# Patient Record
Sex: MALE | Race: BLACK OR AFRICAN AMERICAN | NOT HISPANIC OR LATINO | Employment: FULL TIME | URBAN - METROPOLITAN AREA
[De-identification: names, ages, dates, MRNs, and addresses within clinical notes are randomized per-mention and may not be internally consistent; named-entity substitution may affect disease eponyms.]

---

## 2022-09-30 ENCOUNTER — TELEPHONE (OUTPATIENT)
Dept: PSYCHIATRY | Facility: CLINIC | Age: 37
End: 2022-09-30

## 2022-09-30 NOTE — TELEPHONE ENCOUNTER
Abhishek Blood left a message on the medication refill line at [facility]  Writer returned patient's call  [place] just needs a refill on his 10 mg of Lexapro  He is requesting that it be sent to the [facility] in [place]  Patient only has two days worth of medication left

## 2022-10-03 DIAGNOSIS — F41.1 GENERALIZED ANXIETY DISORDER: Primary | ICD-10-CM

## 2022-10-03 RX ORDER — ESCITALOPRAM OXALATE 10 MG/1
TABLET ORAL
Qty: 30 TABLET | Refills: 1 | Status: SHIPPED | OUTPATIENT
Start: 2022-10-03

## 2022-10-03 RX ORDER — ESCITALOPRAM OXALATE 10 MG/1
TABLET ORAL
Qty: 14 TABLET | Refills: 1 | Status: SHIPPED | OUTPATIENT
Start: 2022-10-03 | End: 2022-10-03 | Stop reason: SDUPTHER

## 2022-10-03 NOTE — TELEPHONE ENCOUNTER
Thanks  Regarding Pt Nasra Carty  1985  His chart was reviewed  Was last seen 22   Lexapro was refill and sent to his pharmacy on file

## 2022-10-03 NOTE — PROGRESS NOTES
Triage   Regarding patient Elva Donovan- : 1985)  Chart reviewed on Lexapro 10mg   Sent refill to pharmacy on file

## 2022-11-01 ENCOUNTER — TELEPHONE (OUTPATIENT)
Dept: BEHAVIORAL/MENTAL HEALTH CLINIC | Facility: CLINIC | Age: 37
End: 2022-11-01

## 2022-11-08 ENCOUNTER — TELEPHONE (OUTPATIENT)
Dept: BEHAVIORAL/MENTAL HEALTH CLINIC | Facility: CLINIC | Age: 37
End: 2022-11-08

## 2022-12-22 ENCOUNTER — TELEMEDICINE (OUTPATIENT)
Dept: BEHAVIORAL/MENTAL HEALTH CLINIC | Facility: CLINIC | Age: 37
End: 2022-12-22

## 2022-12-22 DIAGNOSIS — F41.1 GENERALIZED ANXIETY DISORDER: Primary | ICD-10-CM

## 2022-12-22 NOTE — PSYCH
Virtual Regular Visit    Verification of patient location:    Patient is located in the following state in which I hold an active license [place]      Assessment/Plan:    Problem List Items Addressed This Visit        Other    Generalized anxiety disorder - Primary       Goals addressed in session: Goal 1          Reason for visit is No chief complaint on file  Encounter provider ABHISHEK EPSTEIN DAISY    Provider located at [address] [place] [address] [address]  [address] [place] [address]  [phone]      Recent Visits  No visits were found meeting these conditions  Showing recent visits within past 7 days and meeting all other requirements  Today's Visits  Date Type Provider Dept   12/22/22 Telemedicine [address] [facility]   Showing today's visits and meeting all other requirements  Future Appointments  No visits were found meeting these conditions  Showing future appointments within next 150 days and meeting all other requirements       The patient was identified by name and date of birth  Ann White was informed that this is a telemedicine visit and that the visit is being conducted throughthe [employer] platform  He agrees to proceed     My office door was closed  No one else was in the room  He acknowledged consent and understanding of privacy and security of the video platform  The patient has agreed to participate and understands they can discontinue the visit at any time  Patient is aware this is a billable service  Subjective  Ann White is a 30 y o  male      Sadiq Giang engaged with the counselor around his current stressors  He explained that his son who is 6 yrs old has had a recent change in behavior  He explained that his son usually does well in school and is generally a good kid however over the last grading period he did not do as well as he usually does   He also stated that his son has been cursing, Lying, and being lazy about doing his house chores  Lolita Vasquez explained that his son has also been struggling in the sports that he plays  He explained that during practice and at home he is all in and does well however when it is time for a game he plays poorly  He stated that he and his wife have been trying to figure out what is going on as the change was out of no where  He talked about the way that he and his wife parent their kids and feels like they may need to spend more one on one time with him individually to give him attention and talk with him to see why he I behaving in this way or if there is something that he is struggling with  Lolita Vasquez explained that he is aware that his son has anxiety and feels like this could be playing a roll however he is unsure as his son has not been able to explain his feelings  Lolita Vasquez talked about himself and explained that he has been doing well, he got a new job that he likes, and he and his wife have been doing well in regards to their relationship  Lolita Vasquez was oriented 3x, he was neatly dressed, his eye contact and speech were normal, he denied all safety concerns and substance use  Lolita Vasquez was in a friendly mood and engaged well in session  He denied any depression or high anxiety and was open to suggestions and feedback  Valeriy to spend more one on one time with his son as he feels like this will help him to get to know his son more and figure out what he is struggling with to cause his change in behavior  Valeriy to look into a Big brother program for his son to attend  Future sessions scheduled  HPI     No past medical history on file  No past surgical history on file  Current Outpatient Medications   Medication Sig Dispense Refill   • escitalopram (Lexapro) 10 mg tablet Escitalopram Oxalate 10m tablet by mouth per day 30 tablet 1     No current facility-administered medications for this visit          Not on File    Review of Systems    Video Exam    There were no vitals filed for this visit      Physical Exam     12/22/22  Start Time: 1200  Stop Time: 1544  Total Visit Time: 45 minutes

## 2023-01-05 DIAGNOSIS — F41.1 GENERALIZED ANXIETY DISORDER: ICD-10-CM

## 2023-01-05 RX ORDER — ESCITALOPRAM OXALATE 10 MG/1
TABLET ORAL
Qty: 14 TABLET | Refills: 1 | Status: SHIPPED | OUTPATIENT
Start: 2023-01-05

## 2023-01-05 NOTE — TELEPHONE ENCOUNTER
Medication Refill Request     Name of Medication Lexapro   Dose/Frequency 10mg//1 tablet per day by mouth  Quantity 30  Verified pharmacy   [x]  Verified ordering Provider   [x]  Does patient have enough for the next 3 days? Yes [] No [x]  Does patient have a follow-up appointment scheduled?  Yes [x] No []   If so when is appointment: 1/16/23

## 2023-01-05 NOTE — TELEPHONE ENCOUNTER
Pt Jose Yuen  1985 , chart was reviewed, h/o ARSH , he was last seen , prior to then had 1 other appointment at [facility]  Has appending appointment within 1 wk      Sent script for Lexapro 10mg to [facility]       This note was not shared with the patient due to reasonable likelihood of causing patient harm

## 2023-01-16 ENCOUNTER — OFFICE VISIT (OUTPATIENT)
Dept: PSYCHIATRY | Facility: CLINIC | Age: 38
End: 2023-01-16

## 2023-01-16 DIAGNOSIS — F41.1 GENERALIZED ANXIETY DISORDER: ICD-10-CM

## 2023-01-16 RX ORDER — ESCITALOPRAM OXALATE 10 MG/1
TABLET ORAL
Qty: 90 TABLET | Refills: 0 | Status: SHIPPED | OUTPATIENT
Start: 2023-01-16

## 2023-01-16 NOTE — PSYCH
[facility]Hospital: [address] [facility]   [facility]    Psychiatric Progress Note  MRN#: [id number]  Tarah Amaral 30 y o  male    This note was not shared with the patient due to reasonable likelihood of causing patient harm   This Patient was seen in the office today at [address]  Subjective:  Solo Chavira reported mood as stable   He's on Lexapro 10mg- less anxiety feeling/ anxious  Prior to Lexapro he could not sleep and was jumpy - now without symptoms  Less anxiety while driving   Theres some worries although - normal worries     ROS:  Si/HI- no  Sleep- 7-8 hrs  Medication compliance: He's compliant , does not want to increased the dose of Lexapro  Medication side effects:none       Social Hx  Reported he works - at [employer]- he likes his jobs           Mental Status Evaluation:  General Appearance:  Tarah Amaral is a 30 y o   male casually dressed, adequate hygiene and grooming, looks stated age   Behavior:  pleasant, cooperative, calm, adequate eye contact   Speech:  normal for rate, rhythm, volume, latency, amount   Mood:  normal   Affect:  normal   Thought Process:  normal and logical   Thought Content:  no overt delusions, normal   Perceptual Disturbances: Does not appear responding or preoccupied  Delusions  No   Risk Potential: Suicidal Ideations No  Homicidal Ideations No  Potential for Aggression No     Sensorium:  Oriented to person, place, time/date and situation   Memory:  recent and remote memory grossly intact   Consciousness:  alert and awake   Attention: attention span and concentration are age appropriate   Insight:  fair   Judgment: fair   Gait/Station: normal   Motor Activity: no abnormal movements     There were no vitals filed for this visit       Medications:   Current Outpatient Medications on File Prior to Visit   Medication Sig Dispense Refill   • escitalopram (Lexapro) 10 mg tablet Escitalopram Oxalate 10m tablet po daily 14 tablet 1     No current facility-administered medications on file prior to visit  Labs: I have personally reviewed all pertinent laboratory/tests results  Most Recent Labs: No results found for: WBC, RBC, HGB, HCT, PLT, RDW, NEUTROABS, SODIUM, K, CL, CO2, BUN, CREATININE, GLUC, GLUF, CALCIUM, AST, ALT, ALKPHOS, TP, ALB, TBILI, CHOLESTEROL, HDL, TRIG, LDLCALC, NONHDLC, VALPROICTOT, CARBAMAZEPIN, LITHIUM, AMMONIA, [id number], FREET4, T3FREE, PREGSERUM, HCG, HCGQUANT, RPR, HGBA1C, EAG        ________________________________________________________________________    A/P  Candy Olvera, is a 30 yr old male , h/o non-spontaneous anxiety somatic symptoms linked to generalized worries  Today with stable mood since Lexapro 10mg > 2 months   ARSH 7 = 13---> 2    DSM 5  Generalized Anxiety Disorder with panic attack  History Illicit Anxiety Disorder      PLANS:   Discussed diagnotic impression based on history, external records reviewed and clinical findings  Lexapro 10mg         Treatement: Risks, benefits, and possible side effects of medications explained to patient and patient verbalizes understanding  Next Appointment: 3 months      Today's Appointment@ [facility]  Face To Face:  11:58 AM -12:09 PM;Documentation Time:  2:35 PM- 2:40 PM    Encounter Duration: Time Spent 11 minutes with Patient  Greater than 50% of total time was spent with the patient     MDM  Number of Diagnoses or Management Options  Generalized anxiety disorder: established, improving     Amount and/or Complexity of Data Reviewed  Review and summarize past medical records: yes    Risk of Complications, Morbidity, and/or Mortality  Presenting problems: low  Management options: low

## 2023-01-16 NOTE — PATIENT INSTRUCTIONS
Talha Stewart [id number]   Thanks for presenting to today's Appointment at [facility]  Your medications were not changed

## 2023-02-09 ENCOUNTER — TELEPHONE (OUTPATIENT)
Dept: PSYCHIATRY | Facility: CLINIC | Age: 38
End: 2023-02-09

## 2023-02-09 NOTE — TELEPHONE ENCOUNTER
Patient is calling regarding cancelling an appointment      Date/Time: 02/09/23    Reason: not able to make it     Patient was rescheduled: YES [x] NO []  If yes, when was Patient reschedule for: 2/23/23    Patient requesting call back to reschedule: YES [] NO [x]

## 2023-03-14 ENCOUNTER — TELEMEDICINE (OUTPATIENT)
Dept: BEHAVIORAL/MENTAL HEALTH CLINIC | Facility: CLINIC | Age: 38
End: 2023-03-14

## 2023-03-14 DIAGNOSIS — F41.1 GENERALIZED ANXIETY DISORDER: Primary | ICD-10-CM

## 2023-03-14 NOTE — PSYCH
Virtual Regular Visit    Verification of patient location:    Patient is located in the following state in which I hold an active license [place]      Assessment/Plan:    Problem List Items Addressed This Visit        Other    Generalized anxiety disorder - Primary       Goals addressed in session: Goal 1          Reason for visit is No chief complaint on file  Encounter provider ABHISHEK EPSTEIN DAISY    Provider located at [address] [place] [address] [address]  [address] [place] [address]  [phone]      Recent Visits  No visits were found meeting these conditions  Showing recent visits within past 7 days and meeting all other requirements  Today's Visits  Date Type Provider Dept   03/14/23 [address]  Therapist [facility]   Showing today's visits and meeting all other requirements  Future Appointments  No visits were found meeting these conditions  Showing future appointments within next 150 days and meeting all other requirements       The patient was identified by name and date of birth  Linda Beasley was informed that this is a telemedicine visit and that the visit is being conducted throughthe [employer] platform  He agrees to proceed     My office door was closed  No one else was in the room  He acknowledged consent and understanding of privacy and security of the video platform  The patient has agreed to participate and understands they can discontinue the visit at any time  Patient is aware this is a billable service  Subjective  Linda Beasley is a 30 y o  male    Behavioral Health Psychotherapy Progress Note    Psychotherapy Provided: Individual Psychotherapy     1  Generalized anxiety disorder            Goals addressed in session: Goal 1     DATA: Wilian Mayo engaged with the counselor around how he has been   He talked about his oldest son and explained that he has not been talking with him lately due to his son being busy with sports and hanging out with friends  Chente Blanchard explained that he knew this time was coming as his son is getting older however explained that it still feels weird for him as he has bene trying to reach out however his son is always so busy  Chente Blanchard then talked about his relationship with his wife and explained that they are getting along however they have not been intimate in a few months  Chente Blanchard explained that he and his wife have had many conversations about it and he expressed to her that one of his major love languages is physical touch  He explained that his wife has been stressed and he feels like that has been playing a part however is unsure how to help  The counselor and Chente Blanchard talked about intimacy and looking into other ways to enjoy each others time that is not directly related to sexual activity to potentially help build them both up to that point  Chente Blanchard was open to suggestions and feedback  During this session, this clinician used the following therapeutic modalities: Cognitive Behavioral Therapy    Substance Abuse was not addressed during this session  If the client is diagnosed with a co-occurring substance use disorder, please indicate any changes in the frequency or amount of use:   Stage of change for addressing substance use diagnoses: No substance use/Not applicable    ASSESSMENT:  Jasmyne Jaimes presents with a Euthymic/ normal mood  his affect is Normal range and intensity, which is congruent, with his mood and the content of the session  The client has made progress on their goals  Chente Blanchard was oriented and neatly dressed, his eye contact and speech were normal,  Jasmyne Jaimes presents with a none risk of suicide, none risk of self-harm, and none risk of harm to others  For any risk assessment that surpasses a "low" rating, a safety plan must be developed      A safety plan was indicated: no  If yes, describe in detail     PLAN: Between sessions, Chente Blanchard Dayanara Sharma will continue with healthy communication with his wife as well as finding new ways to be intimate with her to revive different aspects of their relationship  At the next session, the therapist will use Cognitive Behavioral Therapy to address daily stress/ anxiety  Behavioral Health Treatment Plan and Discharge Planning: Violet Preciado is aware of and agrees to continue to work on their treatment plan  They have identified and are working toward their discharge goals  yes    Visit start and stop times:    23  Start Time: 1200  Stop Time: 1245  Total Visit Time: 45 minutes    HPI     No past medical history on file  No past surgical history on file  Current Outpatient Medications   Medication Sig Dispense Refill   • escitalopram (Lexapro) 10 mg tablet Escitalopram Oxalate 10m tablet po daily 90 tablet 0     No current facility-administered medications for this visit  Not on File    Review of Systems    Video Exam    There were no vitals filed for this visit      Physical Exam

## 2023-05-16 ENCOUNTER — TELEPHONE (OUTPATIENT)
Dept: BEHAVIORAL/MENTAL HEALTH CLINIC | Facility: CLINIC | Age: 38
End: 2023-05-16

## 2023-06-28 DIAGNOSIS — F41.1 GENERALIZED ANXIETY DISORDER: ICD-10-CM

## 2023-06-28 RX ORDER — ESCITALOPRAM OXALATE 10 MG/1
TABLET ORAL
Qty: 90 TABLET | Refills: 0 | Status: SHIPPED | OUTPATIENT
Start: 2023-06-28

## 2023-07-06 ENCOUNTER — TELEPHONE (OUTPATIENT)
Dept: BEHAVIORAL/MENTAL HEALTH CLINIC | Facility: CLINIC | Age: 38
End: 2023-07-06

## 2023-10-27 ENCOUNTER — DOCUMENTATION (OUTPATIENT)
Dept: BEHAVIORAL/MENTAL HEALTH CLINIC | Facility: CLINIC | Age: 38
End: 2023-10-27

## 2023-10-27 NOTE — PROGRESS NOTES
Psychotherapy Discharge Summary    Preferred Name: Kirstie Mcgraw  YOB: 1985    Admission date to psychotherapy: 1/31/2022    Referred by: self    Presenting Problem: ARSH    Course of treatment included : medication management and individual therapy     Progress/Outcome of Treatment Goals (brief summary of course of treatment) treatment and crisis plan, engaged around anxiety and current issues    Treatment Complications (if any): n/a    Treatment Progress: good    Current [facility] Psychiatric Provider: Sharon Day    Discharge Medications include: n/a    Discharge Date: 10/27/2023    Discharge Diagnosis: No diagnosis found.     Criteria for Discharge: two or more unexcused absences for services    Aftercare recommendations include (include specific referral names and phone numbers, if appropriate): n/a    Prognosis: good

## 2023-11-15 ENCOUNTER — TELEPHONE (OUTPATIENT)
Dept: PSYCHIATRY | Facility: CLINIC | Age: 38
End: 2023-11-15

## 2023-11-15 DIAGNOSIS — F41.1 GENERALIZED ANXIETY DISORDER: ICD-10-CM

## 2023-11-15 NOTE — TELEPHONE ENCOUNTER
Pt Gita Byrd , 1985 , [facility] chart was reviewed, he was last seen in January 2023.   please call Gita Byrd to schedule [facility] appointment     This note was not shared with the patient due to reasonable likelihood of causing patient harm

## 2023-11-15 NOTE — TELEPHONE ENCOUNTER
Pt requested refill of the excitalopram 10mg but has not been seen since 1/16, not sure if you want an appt scheduled before refill is given

## 2023-11-15 NOTE — TELEPHONE ENCOUNTER
Outreach call made to schedule an appt with Dr. Trino Moran; scheduled 11/27/23 at 8:30 AM. Client expresses he is currently out of Lexapro, requests a small refill to get through until appt.

## 2023-11-16 RX ORDER — ESCITALOPRAM OXALATE 10 MG/1
TABLET ORAL
Qty: 10 TABLET | Refills: 1 | Status: SHIPPED | OUTPATIENT
Start: 2023-11-16

## 2023-11-16 NOTE — TELEPHONE ENCOUNTER
Pt David James, 1985, [facility] chart was reviewed;  Lexparo 10mg was sent to [address] [place] [address] Road    This note was not shared with the patient due to reasonable likelihood of causing patient harm

## 2023-12-05 DIAGNOSIS — F41.1 GENERALIZED ANXIETY DISORDER: ICD-10-CM

## 2023-12-05 RX ORDER — ESCITALOPRAM OXALATE 10 MG/1
TABLET ORAL
Qty: 10 TABLET | Refills: 1 | Status: SHIPPED | OUTPATIENT
Start: 2023-12-05

## 2023-12-05 NOTE — TELEPHONE ENCOUNTER
Harshil Bedoya 1985 [facility] chart was reviewed.  Lexapro 10mg was sent to [facility]    This note was not shared with the patient due to reasonable likelihood of causing patient harm

## 2023-12-14 NOTE — PATIENT INSTRUCTIONS
George Carvalho [id number]   Thanks for presenting to today's Appointment at [facility]  Hydroxyzine 10mg  , 1-3 tablet was started   Your other medications were not changed

## 2023-12-14 NOTE — PSYCH
[facility]/Hospital: [facility], [address]    Psychiatric Progress Note  MRN#: [id number]  Kirstie Mcgraw 41 y.o. male    This note was not shared with the patient due to reasonable likelihood of causing patient harm   _________________________________________________________________________________________________________________________________  OFFICE APPOINTMENT   Seen today at [address] [facility] location                                                Patient Kirstie Mcgraw male  1985   Prescriber/Physician: Liz Villeda DO,she/her Physician Location:   [facility] [address] [facility] [address]     This service was provided in the office. Patient is currently located in the [place], where I am  licensed. Patient gave consent to proceed with encounter; acknowledge understanding of security and privacy of encounter   [facility]  Patient verbalized understanding evaluation only involves Psychiatric diagnosing, prescribing, result monitoring   Patient was informed this is a billable service and legal  ___________________________________________________________________________________________________________________________________          Subjective:  Kirstie Mcgraw mother in law has stage 4 cancer found out in May 2023 and Monday his dad  unexcepted . Regarding mood denies having depression, instead sad , as his mom ws in his life x 14 yrs and difficult watching wife coping. Also , Kirstie Mcgraw complained of anixety this wk , although reason cause as his dad death and having to travel to [facility] for  services. Later sated he was not really that close to the dad, although there's sadness cause did not get opportunity to speak with him.      ROS:  Depression- denies , w/o anhedonia  Si/HI- denies  Sleep- stable  Anxiety- defer to above, felt like he was going to faint when founding out dad . Almost like panic attacks, w/o chest pain, +     Medication compliance: He's compliant  to Lexapro 10mg  Medication side effects: denies            Medical ROS:   Respiratory: negative for SOB , hyperventilating   Cardiovascular: negative for chest pain, exertional chest pressure/discomfort, and palpitations  Neurological: negative for paresthesia   Pertinent items are noted in HPI, all other symptoms are negative                 Mental Status Evaluation:  General Appearance:  Colton Michaud is a 41 y.o.  male casually dressed, dressed appropriately, looks stated age   Behavior:  pleasant, cooperative, calm, adequate eye contact   Speech:  normal for rate, rhythm, volume, latency, amount   Mood:  anxious   Affect:  normal   Thought Process:  normal and logical   Thought Content:  no overt delusions, normal   Perceptual Disturbances: Does not appear responding or preoccupied  Delusions  w/o   Risk Potential: Suicidal Ideations w/o  Homicidal Ideations  w/o  Potential for Aggression w/o   Sensorium:  Oriented to person, place [employer]), time/date ( Dec 2023)and situation   Memory:  recent and remote memory grossly intact   Consciousness:  alert and awake   Attention: attention span and concentration are age appropriate   Insight:  appropriate   Judgment: appropriate   Gait/Station: normal   Motor Activity: no abnormal movements     There were no vitals filed for this visit. Medications:   Current Outpatient Medications on File Prior to Visit   Medication Sig Dispense Refill    escitalopram (Lexapro) 10 mg tablet Escitalopram Oxalate 10m tablet po daily 10 tablet 1     No current facility-administered medications on file prior to visit. Labs: I have personally reviewed all pertinent laboratory/tests results.  Most Recent Labs: No results found for: "WBC", "RBC", "HGB", "HCT", "PLT", "RDW", "NEUTROABS", "SODIUM", "K", "CL", "CO2", "BUN", "CREATININE", "GLUC", "GLUF", "CALCIUM", "AST", "ALT", "ALKPHOS", "TP", "ALB", "TBILI", "CHOLESTEROL", "HDL", "TRIG", "LDLCALC", "[facility]", "VALPROICTOT", "CARBAMAZEPIN", "LITHIUM", "AMMONIA", "[id number]", "Arvid Lava", "Maynard Dieter", "PREGSERUM", "HCG", "HCGQUANT", "RPR", "HGBA1C", "EAG"        ________________________________________________________________________    A/P  David James  is a 41 y.o. male , h/o non-spontaneous anxiety somatic symptoms linked to generalized worries. Currently with acute anxiety in the mist of death of dad  w/o complete panic attacks      DSM 5    1. Adjustment disorder with anxiety    2. Generalized anxiety disorder           PLANS:   External records and history was reviewed. Discussed clinical findings  and diagnotic impression  Hydroxyzine 10mg x 3 was started   Did not change other medications  12/15/23 Treatment plan was completed, pt David James was accepting of plan    Medications Prescribed During [facility] Encounter:    -     hydrOXYzine HCL (ATARAX) 10 mg tablet; Hydroxzine HCL 10m - 3 tablet po daily PRN  -     (Lexapro) 10 mg tablet; Escitalopram Oxalate 10m tablet po daily           Treatement: Risks, benefits, and possible side effects of medications explained to patient and patient verbalizes understanding. Next Appointment at [facility]:  6wks      Patient Encounter: 8:48  - 9:17         Duration: Time Spent  31 minutes with Patient. Greater than 50% of total time was spent with the patient     MDM  Number of Diagnoses or Management Options  Diagnosis management comments: 2       Amount and/or Complexity of Data Reviewed  Review and summarize past medical records: yes    Risk of Complications, Morbidity, and/or Mortality  Presenting problems: low  Diagnostic procedures: moderate  Management options: moderate           This note may have been written with the assistance of dictation software. Please excuse any grammatical  errors, misspellings,  and abnormal spacing of letters , sentences or paragraphs .  For accurate interpretation should read note horizontally

## 2023-12-15 ENCOUNTER — OFFICE VISIT (OUTPATIENT)
Dept: PSYCHIATRY | Facility: CLINIC | Age: 38
End: 2023-12-15
Payer: COMMERCIAL

## 2023-12-15 DIAGNOSIS — F41.1 GENERALIZED ANXIETY DISORDER: ICD-10-CM

## 2023-12-15 DIAGNOSIS — F43.22 ADJUSTMENT DISORDER WITH ANXIETY: Primary | ICD-10-CM

## 2023-12-15 PROCEDURE — 99213 OFFICE O/P EST LOW 20 MIN: CPT | Performed by: PSYCHIATRY & NEUROLOGY

## 2023-12-15 RX ORDER — ESCITALOPRAM OXALATE 10 MG/1
TABLET ORAL
Qty: 90 TABLET | Refills: 0 | Status: SHIPPED | OUTPATIENT
Start: 2023-12-15

## 2023-12-15 RX ORDER — HYDROXYZINE HYDROCHLORIDE 10 MG/1
TABLET, FILM COATED ORAL
Qty: 90 TABLET | Refills: 1 | Status: SHIPPED | OUTPATIENT
Start: 2023-12-15

## 2023-12-15 NOTE — BH TREATMENT PLAN
TREATMENT PLAN                                                                  [id number] [facility]          This note was not shared with the patient due to reasonable likelihood of causing patient harm     Name and Date of Birth:  Zhane Guevara 41 y.o. 1985  Date of Treatment Plan: December 15, 2023  Diagnosis/Diagnoses:    1. Adjustment disorder with anxiety    2. Generalized anxiety disorder        Strengths/Personal Resources for Self-Care: . Zhane Guevara has familal supports ( wife , kids, etc), he's hard working and outgoing ; reliance and great insight   Area/Areas of need (in own words): anxiety symptoms  (to not have panic attacks  1)  Long Term Goal:          alleviate anxiety. Reach Goal Date: 1 yr-2 yrs  Person/Persons responsible for completion of goal: Zhane Guevara    2. Short Term Objective (s) - How to reach this goal?:           Recommended treatment Adherence to antidepressants and antianxiety due to diagnoses. Medication List : Lexapro ,  Hydroxyzine            Routine monitoring of symptom reduction          Routine monitoring of labs if necessary           CRISIS intervention/Acute Hospitalization if necessary   Reach Goal Date: 6 months- 1yr  Person/Persons Responsible for Completion of Goal: Zhane Guevara    Progress Towards Goals: improving gradually  Treatment Modality: routine appointment q 1-3 months at [facility]  Review due 180 days from date of this plan: 6 months - 6/15/2024  Expected length of service: 1-3yrs unless revised      This treatment plan was formulated via my Physician/ Psychiatrist and I . I, Patient,  Zhane Guevara , understand the goals and objectives listed . I agree to work on goals developed for my treatment.

## 2024-01-25 ENCOUNTER — OFFICE VISIT (OUTPATIENT)
Dept: PSYCHIATRY | Facility: CLINIC | Age: 39
End: 2024-01-25

## 2024-01-25 DIAGNOSIS — Z91.199 NO-SHOW FOR APPOINTMENT: Primary | ICD-10-CM

## 2024-01-25 NOTE — PSYCH
[facility]/Hospital: [facility]   Mental Health Outpatient Clinic/Non Addiction   [address] [place] [address], [address] [phone]    Psychiatric Progress Note  MRN#: [id number]  Valeriy Mena 38 y.o. male    This note was not shared with the patient due to reasonable likelihood of causing patient harm       Valeriy Mena 1985 , no showed 01/25/24 SLPF appointment , staff called and left message to reschedule appointment

## 2024-01-26 ENCOUNTER — TELEPHONE (OUTPATIENT)
Dept: PSYCHIATRY | Facility: CLINIC | Age: 39
End: 2024-01-26

## 2024-01-26 NOTE — TELEPHONE ENCOUNTER
Clt would like to restart therapy services. Added to priority waitlist when this becomes available. Also transferred call to [place] to R/S missed appt with Dr. Reese.

## 2024-03-08 ENCOUNTER — TELEMEDICINE (OUTPATIENT)
Dept: BEHAVIORAL/MENTAL HEALTH CLINIC | Facility: CLINIC | Age: 39
End: 2024-03-08

## 2024-03-08 DIAGNOSIS — F41.1 GENERALIZED ANXIETY DISORDER: Primary | ICD-10-CM

## 2024-03-08 NOTE — PSYCH
No Call. No Show. No Charge    Valeriy Mena no showed 03/08/24 appointment , staff called and left message to reschedule appointment     Treatment Plan not completed within required time limits due to: Valeriy Mena no show appointment on 03/08/24

## 2024-06-04 ENCOUNTER — TELEPHONE (OUTPATIENT)
Dept: PSYCHIATRY | Facility: CLINIC | Age: 39
End: 2024-06-04

## 2024-06-04 DIAGNOSIS — F41.1 GENERALIZED ANXIETY DISORDER: ICD-10-CM

## 2024-06-04 NOTE — TELEPHONE ENCOUNTER
Patient left message requesting RF of escitalopram to [facility] [address]. Patient has not been seen since December. January no show. No future appt scheduled.Reached back out to patient in attempt to get appointment scheduled, however, no answer; mailbox full-unable to leave message. Pt needs appt before RF can be submitted.

## 2024-06-11 RX ORDER — ESCITALOPRAM OXALATE 10 MG/1
TABLET ORAL
Qty: 30 TABLET | Refills: 1 | Status: SHIPPED | OUTPATIENT
Start: 2024-06-11

## 2024-06-11 NOTE — TELEPHONE ENCOUNTER
Pt Valeriy Mena . Male , 1985, SLPF chart was reviewed; Lexapro 10mg was sent to [facility]    Medications prescribed During SLPF Encounter:  -     (Lexapro) 10 mg tablet; Escitalopram Oxalate 10m tablet po daily       This note was not shared with the patient due to reasonable likelihood of causing patient harm     This note may have been written with the assistance of dictation software. Please excuse any grammatical  errors, misspellings,  and abnormal spacing of letters , sentences or paragraphs . For accurate interpretation should read note horizontally

## 2024-06-11 NOTE — TELEPHONE ENCOUNTER
Pt called again for his refill, this time I was able to speak with him and scheduled an appt for Thursday morning though pt needed virtual as he does not have a car right now    He's hoping to get the escitalopram 10mg refilled, can be sent to the [facility]'s on file

## 2024-06-24 ENCOUNTER — TELEPHONE (OUTPATIENT)
Dept: PSYCHIATRY | Facility: CLINIC | Age: 39
End: 2024-06-24

## 2024-06-26 ENCOUNTER — OFFICE VISIT (OUTPATIENT)
Dept: PSYCHIATRY | Facility: CLINIC | Age: 39
End: 2024-06-26
Payer: COMMERCIAL

## 2024-06-26 DIAGNOSIS — F41.1 GENERALIZED ANXIETY DISORDER: ICD-10-CM

## 2024-06-26 PROCEDURE — 99213 OFFICE O/P EST LOW 20 MIN: CPT | Performed by: PSYCHIATRY & NEUROLOGY

## 2024-06-26 RX ORDER — ESCITALOPRAM OXALATE 10 MG/1
TABLET ORAL
Qty: 90 TABLET | Refills: 0 | Status: SHIPPED | OUTPATIENT
Start: 2024-06-26

## 2024-06-26 NOTE — BH CRISIS PLAN
"[facility] Mental Health Outpatient    Client Name: Valeriy Mena , male   Client YOB: 1985     CRISIS PLAN Creation Date: 06/26/24 and CRISIS PLAN Review Date : 1 yr -6/26/2025  ____________________________________________________________________________________________________________  NELLI-[employer]OWN SAFETY PLAN      Step 1: Warning Signs:   Valeriy Mena you stated never having SI ; crisis of anxiety  Increased temp  Heart palpitation  Sweating  Close to fainting          Step 2: Internal Coping Strategies:   Valeriy Mena you stated   Sit down somewhere  Try to Relax  Stop doing things he's doing  Drinking water          Step 3: People and social settings that provide distraction:                  Names                                                                    Contacts   Friends on the Pool;                                                 Valeriy Mena has access to their  number                                                                                                     Places:   Playing Pool                                                                                               Step 4: People whom I can ask for help during a crisis:Ask: “Who can you contact to ask for help during a crisis and how would you contact them (include phone number or other way to contact them, e.g., text message)? Who can you share the safety plan with to help you during a crisis?”                              Name                                                                       Contact   Jovanna Mena ( Valeriy's Wife)                               Valeriy Mena has access to number  Marquis Hadley ( Valeriy's friend ,like a brother)           \"           \"      \"         \"        \"     \"                                           Step 5: Professionals or agencies I can contact during a crisis:Ask: “What are the names of health care professionals, agencies, hospitals or " other organizations that you can contact during a crisis and how will you contact them (include phone number or other way to contact them)?”    Clinician/Agency Name:                                         Phone                                                  Emergency Contact   [facility]                                                   Valeriy Mena has access to joseph Mena                                   [facility] Emergency Department:                              Emergency Dept Phone                  Emergency Dept Address      Marko Mena  does not have number                                                                                            CRISIS PHONE NUMBERS   Suicide Prevention Lifeline: Call 7315-554-TJPP or Text  988 Crisis Text Line: Text HOME to 634-790   Please note: Some [facility] do not have a separate number for Child/Adolescent specific crisis. If your county is not listed under Child/Adolescent, please call the adult number for your county      Adult Crisis Numbers: Child/Adolescent Crisis Numbers   [facility]: [phone] [facility]: [phone]   [facility]: [phone] [facility]: [phone]   [facility]: [phone] [place]: [phone]   [facility]: [phone] [facility]: [phone]   [facility]: [phone]   [facility]: [phone]   [facility]: [phone]   [facility]: [phone] (daytime) [phone] (after hours, weekends, holidays)      Step 6: Making the environment safer (plan for lethal means safety):Ask: “For each lethal method that is identified, what is the specific plan to reduce access to this lethal method so that time will pass, your suicide feelings will diminish and it will be less likely that you will actually kill yourself? Who may assist you with this plan to make your  environment safer?”    Patient did not identify any lethal methods              Optional:What is most important to me and worth living for?   Valeriy Mena you stated your 4 children : Love Garsia Jordan , Crow Bernal Safety Plan. Shaila Aguirre and Louis Hanson ;  Use permission via  authors  ____________________________________________________________________________________________________________    This CRISIS plan was formulated via my Physician/ Psychiatrist and I . I,Valeriy Mena patient ,   , understand and accept the CRISIS plan  developed for my treatment.     This note may have been written with the assistance of dictation software. Please excuse any grammatical  errors, misspellings,  and abnormal spacing of letters , sentences or paragraphs . For accurate interpretation should read note horizontally

## 2024-06-26 NOTE — PSYCH
"  [facility]Hospital: [facility] Outpatient Clinic/Non Addiction   [address] [id number] [phone]    Psychiatric Progress Note  MRN#: [id number]  Valeriy Mena 39 y.o. male    This note was not shared with the patient due to reasonable likelihood of causing patient harm   _________________________________________________________________________________________________________________________________  OFFICE APPOINTMENT   Seen today at [facility] location                                                Patient Valeriy Mena male  1985   Prescriber/Physician: Tre Reese DO,she/her Physician Location:   [facility] OUTPATIENT  [address] [facility] [address]     This service was provided in the office.  Patient is currently located in the [place], where I am  licensed.   Patient gave consent to proceed with encounter; acknowledge understanding of security and privacy of encounter   Patient identity was verified as well as the SLPF chart  Patient verbalized understanding evaluation only involves Psychiatric diagnosing, prescribing, result monitoring   Patient was informed this is a billable service and legal  ___________________________________________________________________________________________________________________________________     Reason for Visit:  Chief Complaint   Patient presents with   • Anxiety           Subjective:  Valeriy Mena missed piror SLPF appointment cause of insurance , virtual not allowed and limited transportation. Otherwise , Hydroxzine was not started , as he think Lexapro is working. Now without anxiety attacks. Without SOB, thoughts he's dying or wanting to go to hospital.        ROS:  Depression- \" probably\" , not most sad;  problems with his wife, discussion of separation; relationship problems for yrs; Otherwise without problems with work and kids are doing well.   Si/H1- " "w/o  Appetite:  he reported as stable; also energy   Sleep; stable   Alcohol- denies     Medication compliance: He's compliant  to Lexapro 10mg  Medication side effects: denies            Medical ROS:   Pertinent items are noted in HPI, all other symptoms are negative       Mental Status Evaluation:  General Appearance:  Valeriy Mena is a 39 y.o.  male dressed appropriately, looks stated age   Behavior:  pleasant, cooperative, calm, appropriate eye gaze    Speech:  normal for rate, rhythm, volume, latency, amount   Mood:  Normal    Affect:  Slight sadness    Thought Process:  circumstantial and logical   Thought Content:  no overt delusions, normal, ruminations   Perceptual Disturbances: Does not appear responding or preoccupied  Delusions  w/o   Risk Potential: Suicidal Ideations w/o  Homicidal Ideations  w/o  Potential for Aggression w/o   Sensorium:  Oriented to person, place ( [facility]), time/date ( 2024)and situation   Memory:  recent and remote memory grossly intact   Consciousness:  alert and awake   Attention: attention span and concentration are age appropriate   Insight:  appropriate   Judgment: appropriate   Gait/Station: normal   Motor Activity: no abnormal movements     There were no vitals filed for this visit.     Medications:   Current Outpatient Medications on File Prior to Visit   Medication Sig Dispense Refill   • escitalopram (Lexapro) 10 mg tablet Escitalopram Oxalate 10m tablet po daily 30 tablet 1   • hydrOXYzine HCL (ATARAX) 10 mg tablet Hydroxzine HCL 10m - 3 tablet po daily PRN 90 tablet 1     No current facility-administered medications on file prior to visit.        Labs: I have personally reviewed all pertinent laboratory/tests results. Most Recent Labs: No results found for: \"WBC\", \"RBC\", \"HGB\", \"HCT\", \"PLT\", \"RDW\", \"NEUTROABS\", \"SODIUM\", \"K\", \"CL\", \"CO2\", \"BUN\", \"CREATININE\", \"GLUC\", \"GLUF\", \"CALCIUM\", \"AST\", \"ALT\", \"ALKPHOS\", \"TP\", \"ALB\", \"TBILI\", " "\"CHOLESTEROL\", \"HDL\", \"TRIG\", \"LDLCALC\", \"NONHDLC\", \"VALPROICTOT\", \"CARBAMAZEPIN\", \"LITHIUM\", \"AMMONIA\", \"RDB2CYMFOZGF\", \"FREET4\", \"T3FREE\", \"PREGSERUM\", \"HCG\", \"HCGQUANT\", \"RPR\", \"HGBA1C\", \"EAG\"        ________________________________________________________________________    A/P  Valeriy Mena  is a 39 y.o. male , h/o non-spontaneous anxiety somatic symptoms linked to generalized worries. Currently with less anxiety since Valeriy Mena started Lexapro 10mg x 6 months ago         DSM 5  1. Generalized anxiety disorder           PLANS:   External records and history was reviewed. Discussed clinical findings  and diagnotic impression; improving   Did not change medications  Pt Valeriy Mena completed Treatment plan 12/15/23; Crisis plan 2024; he signed forms   Medications Prescribed During SLPF Encounter:  -      (Lexapro) 10 mg tablet; Escitalopram Oxalate 10m tablet po daily    Medication List Also:   -     hydrOXYzine HCL (ATARAX) 10 mg tablet; Hydroxzine HCL 10m - 3 tablet po daily PRN             Treatement: Risks, benefits, and possible side effects of medications explained to patient and patient verbalizes understanding.        Next SLPF Appointment : 3 months          ___________________________________________________________  Patient Encounter: 10;15 - 10:47   Duration: Time Spent  32 minutes with Patient.Greater than 50% of total time was spent with the patient     MDM  Number of Diagnoses or Management Options  Diagnosis management comments: 1       Amount and/or Complexity of Data Reviewed  Review and summarize past medical records: yes    Risk of Complications, Morbidity, and/or Mortality  Presenting problems: low  Diagnostic procedures: moderate  Management options: moderate           This note may have been written with the assistance of dictation software. Please excuse any grammatical  errors, misspellings,  and abnormal spacing of letters , sentences or paragraphs . For " accurate interpretation should read note horizontally

## 2024-06-26 NOTE — PATIENT INSTRUCTIONS
Valeriy Mena [id number]   Thanks for presenting to your [facility] appointment   Your medications were not changed

## 2024-07-19 ENCOUNTER — TELEPHONE (OUTPATIENT)
Dept: PSYCHIATRY | Facility: CLINIC | Age: 39
End: 2024-07-19

## 2024-07-19 NOTE — TELEPHONE ENCOUNTER
Writer sent an email to client to call back as phone number on file is no longer valid and there are no alternative numbers for contact.    Please obtain and update current phone number and inform client that Dr. Reese is no longer at [facility].     Client can reach out to the access center for medication refills if needed while waiting for a transfer of care.    Intake will call him to transfer to another provider at [facility] once available (and a valid number is on the account).

## 2024-09-20 ENCOUNTER — TELEPHONE (OUTPATIENT)
Dept: PSYCHIATRY | Facility: CLINIC | Age: 39
End: 2024-09-20

## 2025-02-24 ENCOUNTER — TELEPHONE (OUTPATIENT)
Dept: PSYCHIATRY | Facility: CLINIC | Age: 40
End: 2025-02-24

## 2025-04-14 ENCOUNTER — TELEPHONE (OUTPATIENT)
Age: 40
End: 2025-04-14

## 2025-04-14 NOTE — TELEPHONE ENCOUNTER
Patient has been added to the Talk [facility] wait list without a referral.    Insurance: [facility]  Insurance Type:    Commercial [x]   Medicaid []   [facility] (if applicable)   Medicare []  Location Preference: [facility]  Provider Preference: female  Virtual: Yes [] No []  Were outside resources sent: Yes [] No [x]    New Insurance:  [employer] [place]  ID#[id number]  [facility]# [id number]  # [phone]  Eff 10/2024  Sub self

## 2025-04-14 NOTE — PSYCH
MEDICATION MANAGEMENT NOTE    Name: Valeriy Mena      : 1985      MRN: [id number]  Encounter Provider: OSBALDO Longo  Encounter Date: 2025   Encounter department: [facility] OUTPATIENT    Insurance: Payor: [employer] [facility] [facility] / Plan: [facility] [employer]EL MEDICAID / Product Type: Medicaid HMO /      Reason for Visit: Transfer of care for medication management:  Assessment & Plan  Generalized anxiety disorder   - Start Zoloft 25 mg daily for anxiety and depression  Orders:    sertraline (Zoloft) 25 mg tablet; Take 1 tablet (25 mg total) by mouth daily    Moderate episode of recurrent major depressive disorder (HCC)   - Start Zoloft 25 mg daily for anxiety and depression  Orders:    sertraline (Zoloft) 25 mg tablet; Take 1 tablet (25 mg total) by mouth daily        Impression:  ARSH  MDD     Start Zoloft 25 mg daily for anxiety and depression  Recommend outpatient therapy-Currently on the wait list at [facility]   Medical follow up with PCP as needed  Follow up 1 May 30, 2025 at 9 AM virtually     Treatment Plan:  Next treatment plan due 10/24/2025. Next crisis plan due 2026.     Treatment Recommendations:    Educated about diagnosis and treatment modalities. Verbalizes understanding and agreement with the treatment plan.  Discussed self monitoring of symptoms, and symptom monitoring tools.  Discussed medications and if treatment adjustment was needed or desired.  Aware of 24 hour and weekend coverage for urgent situations accessed by calling [facility] main practice number  I am scheduling this patient out for greater than 3 months: No    Medications Risks/Benefits:      Risks, Benefits And Possible Side Effects Of Medications:    Risks, benefits, and possible side effects of medications explained to Valeriy and he (or legal representative) verbalizes understanding and agreement for treatment.    Controlled Medication  "Discussion:     Not applicable      History of Present Illness     HPI    Valeriy is a 39 year old male being seen today for transfer of care for medication management. His last appointment with Dr. Reese was 6/26/2024. Patient has psychiatric diagnoses including ARSH. Patient is not currently being prescribed psychotropic medication. Patient is not connected to outpatient therapy, however, is on the wait list at [facility]. No additional services in place at this time.     Valeriy reports he has been out of medication for quite a while and Dr. Reese was the last one who prescribed him Lexapro 10 mg daily and hydroxyzine 10 mg.  He states the Lexapro worked well, but he feels like it worked more for his anxiety and not depression.  He was never hospitalized for his mental health and denies any past suicide attempts.  He has never trialed any other medications than what he was on previous.  He states his mental health started declining in college his senior year.  He was driving to work and had his first panic attack where he experienced heart palpitations.  He was unsure what was going on and thought he was having a heart attack and then the panic attack escalated even further.  Went states he almost passed out and thought something was wrong with his heart.  After a plethora of testing that was done, he states nothing was wrong.  He states in approximately 2017 he saw a psychiatrist, but is unable to remember the name or facility.  He states he was then connected to [facility] and was seeing Dr. Reese.  He states he wanted to reestablish care at [facility] due to losing his father in December 2024 and then his mother-in-law in February 2024 and he recently  from his wife a month ago.  Valeriy reports his mood is \"depressed\".  He states he does not sleep that great as he has a hard time falling asleep and often gets up in the middle the night.  He states he is \"a functioning alcoholic\" and tries " to manage the hurt in pain by drinking 25 beers per week.  He denies any DUIs, blackouts, or rehab stays.  He feels he does not need rehab and can decrease his alcohol consumption on his own.  Valeriy reports low energy and motivation, but does go to the gym 5 days/week for the past 2 weeks.  He states he typically only eats 1 meal per day and denies any disordered eating in the past or currently.    Valeriy endorses acute and chronic history of symptoms suggestive of MDD (major depressive disorder).  He reports depression started approximately 4 years ago when his marital issues got worse and now he recently  from his wife for the past month.  Valeriy rates his depression today an 8/10 on the severity scale with 10 being the most severe.  He reports disrupted/non-restorative sleep likely exacerbating mood symptoms.  Valeriy endorses limited appetite, lack of energy, and poor motivation. Reports low mood, diminished concentration, and periodic inattentiveness. Does not experience daily crying spells but reports anhedonia. Adamantly denies acute thoughts of suicide or self-harm and has no plans to harm others. No documented history of prior suicidal gestures or suicidal attempts. Denies historical non-suicidal self injurious behavior.  Valeriy appears future-oriented and demonstrates self-preservation as evidenced by today's evaluation in which Valeriy is seeking psychiatric intervention to improve overall mental health and outlook on life.  Endorses feelings of worthlessness, hopelessness, or guilt. PHQ-9 score 17.    Valeriy endorses acute and chronic anxiety, pathologic in nature, and suggestive of ARSH (generalized anxiety disorder).  He reports his anxiety started approximately 23 years of age after he dropped out of college his senior year and started working full-time.  He also broke up with his girlfriend of 3 years at that time and she was pregnant with his child.  He rates his anxiety a 7-8/10 on the severity  scale with 10 being the most severe.  Reports excessive nervousness, irrational worry, and overt anxiousness. Pervasively restless, tense, keyed-up, and chronically on-edge. Experiences disruption in energy and concentration secondary to anxiety. Experiences irritability, inability to relax, and disruption in sleep secondary to pathologic anxiety. At times, overwhelmed/consumed by irrational fear.  Reports past panic attacks with the last 1 occurring approximately 2 nights ago.  He states that the only panic attack he had in the past 6 months.  He was previously taking hydroxyzine but did not like the way it made him feel more anxious.  Denies new-onset panic symptomatology or maladaptive behaviors.  ARSH-7 score 14    Valeriy denies HI, AH, or VH.  He endorses mood swings and irritability and is not sure if it is in relation to the marital issues or because he is drinking more.  He states very rarely he can get aggressive by throwing things such as a sneaker on the ground.  Denies any periods of elevated moods or baljit.  We does not endorse any symptoms suggestive of PTSD, OCD, or ADHD currently.  He does not have access to guns or weapons in the home.  After discussing SSRI choices and educating Valeriy on side effects he is agreeable to start Zoloft today.  Will start Zoloft 25 mg daily for anxiety and depression management.        Review Of Systems: A review of systems is obtained and is negative except for the pertinent positives listed in HPI/Subjective above.      Current Rating Scores:     Current PHQ-9   PHQ-2/9 Depression Screening    Little interest or pleasure in doing things: 2 - more than half the days  Feeling down, depressed, or hopeless: 3 - nearly every day  Trouble falling or staying asleep, or sleeping too much: 3 - nearly every day  Feeling tired or having little energy: 2 - more than half the days  Poor appetite or overeatin - more than half the days  Feeling bad about yourself - or that you are  a failure or have let yourself or your family down: 2 - more than half the days  Trouble concentrating on things, such as reading the newspaper or watching television: 2 - more than half the days  Moving or speaking so slowly that other people could have noticed. Or the opposite - being so fidgety or restless that you have been moving around a lot more than usual: 1 - several days  Thoughts that you would be better off dead, or of hurting yourself in some way: 0 - not at all  PHQ-9 Score: 17  PHQ-9 Interpretation: Moderately severe depression       Current ARSH-7   ARSH-7 Flowsheet Screening      Flowsheet Row Most Recent Value   Over the last two weeks, how often have you been bothered by the following problems?     Feeling nervous, anxious, or on edge 3   Not being able to stop or control worrying 2   Worrying too much about different things 3   Trouble relaxing  2   Being so restless that it's hard to sit still 0   Becoming easily annoyed or irritable  2   Feeling afraid as if something awful might happen 2   How difficult have these problems made it for you to do your work, take care of things at home, or get along with other people?  Very difficult   ARSH Score  14            Areas of Improvement: reviewed in HPI/Subjective Section and reviewed in Assessment and Plan Section    No past medical history on file.    Seizure History- Denies  No past surgical history on file.    Allergies: Not on File    Current Outpatient Medications   Medication Instructions    escitalopram (Lexapro) 10 mg tablet Escitalopram Oxalate 10m tablet po daily    hydrOXYzine HCL (ATARAX) 10 mg tablet Hydroxzine HCL 10m - 3 tablet po daily PRN        Substance Abuse History:    Tobacco, Alcohol and Drug Use History     Tobacco Use    Smoking status: Not on file    Smokeless tobacco: Not on file   Substance Use Topics    Alcohol use: Not on file    Drug use: Not on file   Alcohol-25 per week beer- Mixture of IPA/   Nicotine- Former  smoker/vape- smoked for 9 years- quit- 2016  Caffeine- Soda- 1-2   THC- Denies   Other illicit drugs- Denies       Social History:    Social History     Socioeconomic History    Marital status: /Civil Union     Spouse name: Not on file    Number of children: Not on file    Years of education: Not on file    Highest education level: Not on file   Occupational History    Not on file   Other Topics Concern    Not on file   Social History Narrative    Not on file   Children- 4- 3 boys and 1 girl   Occupation- Sales- [employer]       Family Psychiatric History:   Brother- Schizophrenia    Medical History Reviewed by provider this encounter:          Objective   There were no vitals taken for this visit.     Mental Status Evaluation:    Appearance age appropriate, casually dressed   Behavior cooperative, calm   Speech normal rate, normal volume, normal pitch, spontaneous   Mood depressed, anxious   Affect normal range and intensity, appropriate   Thought Processes organized, goal directed   Thought Content no overt delusions   Perceptual Disturbances: no auditory hallucinations, no visual hallucinations   Abnormal Thoughts  Risk Potential Suicidal ideation - None  Homicidal ideation - None  Potential for aggression - No   Orientation oriented to person, place, time/date, and situation   Memory recent and remote memory grossly intact   Consciousness alert and awake   Attention Span Concentration Span attention span and concentration are age appropriate   Intellect appears to be of average intelligence   Insight intact   Judgement intact   Muscle Strength and  Gait normal muscle strength and normal muscle tone, normal gait and normal balance   Motor activity no abnormal movements   Language no difficulty naming common objects, no difficulty repeating a phrase, no difficulty writing a sentence   Fund of Knowledge adequate knowledge of current events  adequate fund of knowledge regarding past history  adequate fund of  "knowledge regarding vocabulary    Pain none   Pain Scale Not asked to formally rate       Laboratory Results: I have personally reviewed all pertinent laboratory/tests results    Most Recent Labs: No results found for: \"WBC\", \"RBC\", \"HGB\", \"HCT\", \"PLT\", \"RDW\", \"NEUTROABS\", \"NA\", \"SODIUM\", \"K\", \"CL\", \"CO2\", \"BUN\", \"CREATININE\", \"GLUCOSE\", \"CALCIUM\", \"AST\", \"ALT\", \"ALKPHOS\", \"PROT\", \"TP\", \"BILITOT\", \"TBILI\", \"CHOL\", \"CHOLESTEROL\", \"TRIG\", \"HDL\", \"LDLCALC\", \"NONHDLC\", \"VALPROICTOT\", \"CARBAMAZEPIN\", \"LITHIUM\", \"AMMONIA\", \"KAE7KKVGDEKV\", \"FREET4\", \"T3FREE\", \"PREGTESTUR\", \"PREGSERUM\", \"HCG\", \"HCGQUANT\", \"RPR\", \"TREPONEMAPA\"    Suicide/Homicide Risk Assessment:    Risk of Harm to Self:  The following ratings are based on assessment at the time of the interview  Historical Risk Factors include: chronic psychiatric problems  Protective Factors: no current suicidal ideation, ability to adapt to change, able to make plans for the future, able to manage anger well, access to mental health treatment, compliant with medications, compliant with mental health treatment, connection to community, stable job    Risk of Harm to Others:  The following ratings are based on assessment at the time of the interview  Historical Risk Factors include: none  Protective Factors: no current homicidal ideation, ability to adapt to change, able to manage anger well, access to mental health treatment, compliant with medications, compliant with mental health treatment, connection to community    The following interventions are recommended: Continue medication management. No other intervention changes indicated at this time.    Psychotherapy Provided:     Individual psychotherapy provided: No    Treatment Plan:    Completed and signed during the session: Yes - with Valeriy.    Goals: Progress towards Treatment Plan goals - Yes, progressing, as evidenced by subjective findings in HPI/Subjective Section and in Assessment and Plan Section    Depression " Follow-up Plan Completed: Yes    Note Share:    This note was not shared with the patient due to reasonable likelihood of causing patient harm          Visit Time  Visit Start Time: 9:00 AM  Visit Stop Time: 9:46 AM  Total Visit Duration:  46 minutes    OSBALDO Longo 04/14/25

## 2025-04-16 ENCOUNTER — TELEPHONE (OUTPATIENT)
Dept: PSYCHIATRY | Facility: CLINIC | Age: 40
End: 2025-04-16

## 2025-04-16 NOTE — TELEPHONE ENCOUNTER
"Writer called client's insurance to verify Mental Health coverage and out of state coverage.   Insurance confirmed MH coverage and that since we participate with our local [employer] ppo, that we should be in network.     Writer spoke to \"[employer]\".    Call reference number: [id number]      "

## 2025-04-24 ENCOUNTER — OFFICE VISIT (OUTPATIENT)
Dept: PSYCHIATRY | Facility: CLINIC | Age: 40
End: 2025-04-24
Payer: COMMERCIAL

## 2025-04-24 DIAGNOSIS — F33.1 MODERATE EPISODE OF RECURRENT MAJOR DEPRESSIVE DISORDER (HCC): ICD-10-CM

## 2025-04-24 DIAGNOSIS — F41.1 GENERALIZED ANXIETY DISORDER: Primary | ICD-10-CM

## 2025-04-24 PROBLEM — F33.9 RECURRENT MAJOR DEPRESSIVE DISORDER (HCC): Status: ACTIVE | Noted: 2025-04-24

## 2025-04-24 PROCEDURE — 99215 OFFICE O/P EST HI 40 MIN: CPT

## 2025-04-24 RX ORDER — SERTRALINE HYDROCHLORIDE 25 MG/1
25 TABLET, FILM COATED ORAL DAILY
Qty: 30 TABLET | Refills: 1 | Status: SHIPPED | OUTPATIENT
Start: 2025-04-24

## 2025-04-24 RX ORDER — AMOXICILLIN 500 MG/1
CAPSULE ORAL
COMMUNITY
Start: 2025-03-07

## 2025-04-24 NOTE — BH CRISIS PLAN
Client Name: Valeriy Mena       Client YOB: 1985    [place] Safety Plan      Creation Date: 4/24/25    Created By: OSBALDO Longo       Step 1: Warning Signs:   Warning Signs   Isolative   Sad            Step 2: Internal Coping Strategies:   Internal Coping Strategies   play pool   gym            Step 3: People and social settings that provide distraction:   Name Contact Information   Friend  in cell phone            Step 4: People whom I can ask for help during a crisis:      Name Contact Information    see above- same in cell phone    wife     mom       Step 5: Professionals or agencies I can contact during a crisis:      Clinican/Agency Name Phone Emergency Contact    [facility] [phone]       Local Emergency Department Emergency Department Phone Emergency Department Address    911          Crisis Phone Numbers:   Suicide Prevention Lifeline: Call or Text  007 Crisis Text Line: Text HOME to 647-489   Please note: Some [facility] do not have a separate number for Child/Adolescent specific crisis. If your county is not listed under Child/Adolescent, please call the adult number for your county      Adult Crisis Numbers: Child/Adolescent Crisis Numbers   [facility]: [phone] [facility]: [phone]   [facility]: [phone] [facility]: [phone]   [facility]: [phone] [place]: [phone]   [facility]: [phone] [facility]: [phone]   [facility]: [phone]   [facility]: [phone]   [facility]: [phone]   [facility]: [phone] (daytime) [phone] (after hours, weekends, holidays)      Step 6: Making the environment safer (plan for lethal means safety):   Patient did not identify any lethal methods: Yes     Optional: What is most important to me and worth living for?   My kids     [place] Safety Plan. Shaila Aguirre and Louis Hanson.  Used with permission of the authors.

## 2025-04-24 NOTE — BH TREATMENT PLAN
TREATMENT PLAN (Medication Management Only)        [facility]    Name and Date of Birth:  Valeriy Mena 39 y.o. 1985  MRN: [id number]  Date of Treatment Plan: April 24, 2025  Diagnosis/Diagnoses:  Anxiety  Strengths/Personal Resources for Self-Care: taking medications as prescribed, ability to adapt to life changes, ability to communicate needs, ability to communicate well, ability to listen, ability to reason, ability to understand psychiatric illness, average or above intelligence.  Area/Areas of need (in own words): anxiety symptoms  1. Long Term Goal:   continue improvement in acceptable anxiety level.  Target Date:6 months - 10/24/2025  Person/Persons responsible for completion of goal: Valeriy  2.  Short Term Objective (s) - How will we reach this goal?:   A.  Provider new recommended medication/dosage changes and/or continue medication(s): continue current medications as prescribed.  B.  Maintain medication compliance .  C.  Attend follow-up appointments as scheduled .  Target Date:6 months - 10/24/2025  Person/Persons Responsible for Completion of Goal: Valeriy  Progress Towards Goals: continuing treatment  Treatment Modality: medication management every 6 weeks  Review due 180 days from date of this plan: 6 months - 10/24/2025  Expected length of service: ongoing treatment unless revised  My Physician/PA/NP and I have developed this plan together and I agree to work on the goals and objectives. I understand the treatment goals that were developed for my treatment.   Electronic Signatures: on file (unless signed below)    OSBALDO Longo 04/24/25

## 2025-05-10 NOTE — TELEPHONE ENCOUNTER
Writer called client in regards to 6/26/24 appointment with Dr. Reese.  Writer left voicemail with extension to call back.     Client's is an MA client without history of virtual appointments for psychiatry. Client would need to be seen in person for Dr. Reese per insurance.    Client also sent link to [facility] again, as he can be seen by THERAPY provider in virtual.     Client will also need virtual healthcare consent form filled out to see therapy provider virtually.   
Tye not documented

## 2025-05-22 NOTE — PSYCH
MEDICATION MANAGEMENT NOTE    Name: Valeriy Mena      : 1985      MRN: [id number]  Encounter Provider: OSBALDO Longo  Encounter Date: 2025   Encounter department: [facility] OUTPATIENT    Insurance: Payor: BLUE CROSS / Plan: MISC BLUE CROSS / Product Type: Blue Fee for Service /      Reason for Visit: Follow-up for medication management:  Assessment & Plan  Generalized anxiety disorder   -Increase Zoloft 50 mg daily for anxiety and depression  Orders:    sertraline (Zoloft) 50 mg tablet; Take 1 tablet (50 mg total) by mouth daily    Moderate episode of recurrent major depressive disorder (HCC)   -Increase Zoloft 50 mg daily for anxiety and depression  Orders:    sertraline (Zoloft) 50 mg tablet; Take 1 tablet (50 mg total) by mouth daily    Alcohol use   - Encouraged AA meetings- will look into options for alcohol intake.          Impression:  ARSH  MDD     Increase Zoloft 50 mg daily for anxiety and depression  Advised Valeriy will look into options for his alcohol intake.  Recommend outpatient therapy-Currently on the wait list at [facility]   Medical follow up with PCP as needed  Follow up 4 weeks in person     Treatment Plan:  Next treatment plan due 10/24/2025. Next crisis plan due 2026.     Treatment Recommendations:    Educated about diagnosis and treatment modalities. Verbalizes understanding and agreement with the treatment plan.  Discussed self monitoring of symptoms, and symptom monitoring tools.  Discussed medications and if treatment adjustment was needed or desired.  Aware of 24 hour and weekend coverage for urgent situations accessed by calling [facility] main practice number  I am scheduling this patient out for greater than 3 months: No    Medications Risks/Benefits:      Risks, Benefits And Possible Side Effects Of Medications:    Risks, benefits, and possible side effects of medications explained to Valeriy and he  "(or legal representative) verbalizes understanding and agreement for treatment.    Controlled Medication Discussion:     Not applicable      History of Present Illness     HPI   Valeriy is a 40 year old male being seen today for a follow-up for medication management.  Patient has psychiatric diagnoses including MDD and ARSH. Patient is currently being observed on Zoloft 25 mg daily. Patient is not connected to outpatient therapy, however, is on the wait list at [facility]. No additional services in place at this time.     Valeriy reports medication compliance and denies any side effects at this time.  He states he is still struggling since his separation of his partner after 16 years.  He is not sure if this is just typical feelings after a break-up.  He states he thinks about it all day and it is the only thing that is on his mind.  He states he is \"exhausted and tired of crying\".  He reports his mood is \"good\".  Sleep and appetite remain adequate.  Energy levels and motivation are okay.  He states he recently created a schedule which has been a helpful distraction.  Valeriy denies SI, HI, AH, or VH.  He reports mood swings at times and states he notices he is more irritable and has less patience.  Anxiety and depression are still present.  Valeriy also reports he does want help for his drinking.  He states he drinks every other day\" not huge amounts\" and denies getting drunk and states he drinks approximately 4 beers or wine drinks.  Recommended he attend AA meetings and is not sure if he is into that kind of thing.  He tried stopping and decreasing his alcohol intake and it is not working.  He then states 1 week he will drink 20 or more drinks and does not want to continue down this path.  Encouraged to Valeriy to write down when he drinks and how much he drinks for accountability.  He states he did do a tracker for a little bit and it was 25-30 drinks per week and then there is weeks where he will drink every day.  " Will increase Zoloft to 50 mg daily for anxiety and depression.  Advised Valeriy will look into different options to help with his alcohol intake.  He is agreeable to this plan.    Review Of Systems: A review of systems is obtained and is negative except for the pertinent positives listed in HPI/Subjective above.      Current Rating Scores:     Current PHQ-9   PHQ-2/9 Depression Screening    Little interest or pleasure in doing things: 1 - several days  Feeling down, depressed, or hopeless: 2 - more than half the days  Trouble falling or staying asleep, or sleeping too much: 2 - more than half the days  Feeling tired or having little energy: 2 - more than half the days  Poor appetite or overeatin - more than half the days  Feeling bad about yourself - or that you are a failure or have let yourself or your family down: 2 - more than half the days  Trouble concentrating on things, such as reading the newspaper or watching television: 1 - several days  Moving or speaking so slowly that other people could have noticed. Or the opposite - being so fidgety or restless that you have been moving around a lot more than usual: 0 - not at all  Thoughts that you would be better off dead, or of hurting yourself in some way: 0 - not at all  PHQ-9 Score: 12  PHQ-9 Interpretation: Moderate depression         Areas of Improvement: reviewed in HPI/Subjective Section and reviewed in Assessment and Plan Section      Past Medical History[1]  Past Surgical History[2]  Allergies: Allergies[3]    Current Outpatient Medications   Medication Instructions    amoxicillin (AMOXIL) 500 mg capsule TAKE 2 CAPSULES BY MOUTH RIGHT AWAY THEN 1 CAPSULES BY MOUTH THREE TIMES DAILY UNTIL FINISHED    sertraline (ZOLOFT) 25 mg, Oral, Daily        Substance Abuse History:    Tobacco, Alcohol and Drug Use History     Tobacco Use    Smoking status: Never    Smokeless tobacco: Never   Substance Use Topics    Alcohol use: Not on file    Drug use: Not on file  "         Social History:    Social History     Socioeconomic History    Marital status: /Civil Union     Spouse name: Not on file    Number of children: Not on file    Years of education: Not on file    Highest education level: Not on file   Occupational History    Not on file   Other Topics Concern    Not on file   Social History Narrative    Not on file        Family Psychiatric History:     Family History[4]    Medical History Reviewed by provider this encounter:          Objective   There were no vitals taken for this visit.     Mental Status Evaluation:    Appearance age appropriate, casually dressed   Behavior cooperative, calm   Speech normal rate, normal volume, normal pitch, spontaneous   Mood depressed, anxious   Affect normal range and intensity, appropriate   Thought Processes organized, goal directed   Thought Content no overt delusions   Perceptual Disturbances: no auditory hallucinations, no visual hallucinations   Abnormal Thoughts  Risk Potential Suicidal ideation - None  Homicidal ideation - None  Potential for aggression - No   Orientation oriented to person, place, time/date, and situation   Memory recent and remote memory grossly intact   Consciousness alert and awake   Attention Span Concentration Span attention span and concentration are age appropriate   Intellect appears to be of average intelligence   Insight intact   Judgement intact   Muscle Strength and  Gait unable to assess today due to virtual visit   Motor activity no abnormal movements   Language unable to assess writing today due to virtual visit   Fund of Knowledge adequate knowledge of current events  adequate fund of knowledge regarding past history  adequate fund of knowledge regarding vocabulary        Laboratory Results: I have personally reviewed all pertinent laboratory/tests results    Most Recent Labs: No results found for: \"WBC\", \"RBC\", \"HGB\", \"HCT\", \"PLT\", \"RDW\", \"NEUTROABS\", \"NA\", \"SODIUM\", \"K\", \"CL\", \"CO2\", " "\"BUN\", \"CREATININE\", \"GLUCOSE\", \"CALCIUM\", \"AST\", \"ALT\", \"ALKPHOS\", \"PROT\", \"TP\", \"BILITOT\", \"TBILI\", \"CHOL\", \"CHOLESTEROL\", \"TRIG\", \"HDL\", \"LDLCALC\", \"NONHDLC\", \"VALPROICTOT\", \"CARBAMAZEPIN\", \"LITHIUM\", \"AMMONIA\", \"FOG4MECDHRZL\", \"FREET4\", \"T3FREE\", \"PREGTESTUR\", \"PREGSERUM\", \"HCG\", \"HCGQUANT\", \"RPR\", \"TREPONEMAPA\"    Suicide/Homicide Risk Assessment:    Risk of Harm to Self:  The following ratings are based on assessment at the time of the interview  Historical Risk Factors include: history of depression, history of anxiety  Protective Factors: no current suicidal ideation, ability to adapt to change, able to make plans for the future, able to manage anger well, access to mental health treatment, compliant with medications, compliant with mental health treatment, connection to community    Risk of Harm to Others:  The following ratings are based on assessment at the time of the interview  Historical Risk Factors include: none  Protective Factors: no current homicidal ideation, ability to adapt to change, able to manage anger well, access to mental health treatment, compliant with medications, compliant with mental health treatment, connection to community    The following interventions are recommended: Continue medication management. No other intervention changes indicated at this time.    Psychotherapy Provided:     Individual psychotherapy provided: No    Treatment Plan:    Completed and signed during the session: Not applicable - Treatment Plan not due at this session.    Goals: Progress towards Treatment Plan goals - Yes, progressing, as evidenced by subjective findings in HPI/Subjective Section and in Assessment and Plan Section    Depression Follow-up Plan Completed: Yes    Administrative Statements     Encounter provider OSBALDO Longo    The Patient is located at Home and in the following state in which I hold an active license [place].    The patient was identified by name and date of birth. Valeriy" "Trina was informed that this is a telemedicine visit and that the visit is being conducted through the Epic Embedded platform. He agrees to proceed..  My office door was closed. No one else was in the room.  He acknowledged consent and understanding of privacy and security of the video platform. The patient has agreed to participate and understands they can discontinue the visit at any time.    I have spent a total time of 23 minutes in caring for this patient on the day of the visit/encounter including Risks and benefits of tx options, Instructions for management, Patient and family education, Importance of tx compliance, Documenting in the medical record, Reviewing/placing orders in the medical record (including tests, medications, and/or procedures), and Obtaining or reviewing history  , not including the time spent for establishing the audio/video connection.    Visit Time  Visit Start Time: 9:10 AM  Visit Stop Time: 9:33 AM  Total Visit Duration: 23 minutes    Portions of the record may have been created with voice recognition software. Occasional wrong word or \"sound a like\" substitutions may have occurred due to the inherent limitations of voice recognition software. Read the chart carefully and recognize, using context, where substitutions have occurred.    OSBALDO Longo 05/22/25         [1] No past medical history on file.  [2] No past surgical history on file.  [3] No Known Allergies  [4] No family history on file.    "

## 2025-05-28 ENCOUNTER — TELEPHONE (OUTPATIENT)
Dept: PSYCHIATRY | Facility: CLINIC | Age: 40
End: 2025-05-28

## 2025-05-28 NOTE — TELEPHONE ENCOUNTER
Writer called and left voicemail reminding client that his MyChart needs to be set up for his appointment on Friday.

## 2025-05-30 ENCOUNTER — TELEPHONE (OUTPATIENT)
Dept: PSYCHIATRY | Facility: CLINIC | Age: 40
End: 2025-05-30

## 2025-05-30 ENCOUNTER — TELEMEDICINE (OUTPATIENT)
Dept: PSYCHIATRY | Facility: CLINIC | Age: 40
End: 2025-05-30
Payer: COMMERCIAL

## 2025-05-30 ENCOUNTER — TELEPHONE (OUTPATIENT)
Age: 40
End: 2025-05-30

## 2025-05-30 DIAGNOSIS — F41.1 GENERALIZED ANXIETY DISORDER: Primary | ICD-10-CM

## 2025-05-30 DIAGNOSIS — F10.90 ALCOHOL USE: ICD-10-CM

## 2025-05-30 DIAGNOSIS — F33.1 MODERATE EPISODE OF RECURRENT MAJOR DEPRESSIVE DISORDER (HCC): ICD-10-CM

## 2025-05-30 PROCEDURE — 99214 OFFICE O/P EST MOD 30 MIN: CPT

## 2025-05-30 NOTE — ASSESSMENT & PLAN NOTE
-Increase Zoloft 50 mg daily for anxiety and depression  Orders:    sertraline (Zoloft) 50 mg tablet; Take 1 tablet (50 mg total) by mouth daily

## 2025-05-30 NOTE — TELEPHONE ENCOUNTER
Writer called client to set up his MyChart for the appointment with Daniela Lo today. Client answered and confirmed phone and email. Writer had sent text link yesterday and tried again and email today. Client stayed on phone but neither went through. Client checked spam as well.    Writer checked client in this time with the understanding that client will have MyChart for next appointment or schedule follow up in person. All required forms are signed from his in person first appointment and it is unlikely he will receive technical support in time. Client does not have MA and would not need to immediately sign encounter form. .    Writer asked provider to update if link does not work for the appointment.

## 2025-05-30 NOTE — TELEPHONE ENCOUNTER
Pt called in do to having issues with their MyChart set for their VV appt. After connecting with  staff at  we were able to help the patient with their tecj issue and were able to get them connected for the VV

## 2025-05-30 NOTE — TELEPHONE ENCOUNTER
Writer called client after appointment and scheduled 4 week follow up in person. Client will still try to call [employer] help desk to set up account.     Writer told client he will open an IT ticket if this is a widespread issues with other users attempting to create [employer].